# Patient Record
Sex: FEMALE | Race: OTHER | HISPANIC OR LATINO | URBAN - METROPOLITAN AREA
[De-identification: names, ages, dates, MRNs, and addresses within clinical notes are randomized per-mention and may not be internally consistent; named-entity substitution may affect disease eponyms.]

---

## 2022-02-21 PROBLEM — 275978004 COLON CANCER SCREENING: Status: ACTIVE | Noted: 2022-02-21

## 2022-03-07 ENCOUNTER — OFFICE VISIT (OUTPATIENT)
Dept: URBAN - METROPOLITAN AREA SURGERY CENTER 18 | Facility: SURGERY CENTER | Age: 52
End: 2022-03-07
Payer: COMMERCIAL

## 2022-03-07 DIAGNOSIS — Z12.11 AVERAGE RISK FOR CRC. DUE TO PT'S CO-MORBID STATE WITH END STAGE DEMENTIA, HIGH RISK FOR ANESTHESIA (PER NEUROLOGY); INABILITY TO TAKE A BOWEL PREP....WOULD NOT ADVISE ANY COLORECTAL CANCER SCREENING INCLUDING STOOL TEST FOR FECAL BLOOD.: ICD-10-CM

## 2022-03-07 PROCEDURE — 45378 DIAGNOSTIC COLONOSCOPY: CPT | Performed by: INTERNAL MEDICINE

## 2022-03-07 PROCEDURE — G8907 PT DOC NO EVENTS ON DISCHARG: HCPCS | Performed by: INTERNAL MEDICINE

## 2022-03-11 ENCOUNTER — OFFICE VISIT (OUTPATIENT)
Dept: URBAN - METROPOLITAN AREA SURGERY CENTER 18 | Facility: SURGERY CENTER | Age: 52
End: 2022-03-11

## 2022-10-06 ENCOUNTER — TELEPHONE ENCOUNTER (OUTPATIENT)
Dept: URBAN - METROPOLITAN AREA CLINIC 92 | Facility: CLINIC | Age: 52
End: 2022-10-06

## 2022-10-06 ENCOUNTER — OFFICE VISIT (OUTPATIENT)
Dept: URBAN - METROPOLITAN AREA TELEHEALTH 2 | Facility: TELEHEALTH | Age: 52
End: 2022-10-06

## 2022-10-06 RX ORDER — ONDANSETRON 4 MG/1
TABLET, ORALLY DISINTEGRATING ORAL
Qty: 0 | Refills: 0 | Status: ACTIVE | COMMUNITY
Start: 1900-01-01 | End: 1900-01-01

## 2022-10-06 RX ORDER — DICYCLOMINE HYDROCHLORIDE 20 MG/1
TABLET ORAL
Qty: 0 | Refills: 0 | Status: ACTIVE | COMMUNITY
Start: 1900-01-01 | End: 1900-01-01

## 2022-10-06 RX ORDER — MECLIZINE HYDROCHLORIDE 12.5 MG/1
TABLET ORAL
Qty: 0 | Refills: 0 | Status: ACTIVE | COMMUNITY
Start: 2017-08-18 | End: 1900-01-01

## 2022-10-06 RX ORDER — CIPROFLOXACIN HYDROCHLORIDE 500 MG/1
TABLET, FILM COATED ORAL
Qty: 0 | Refills: 0 | Status: ACTIVE | COMMUNITY
Start: 2017-08-15 | End: 1900-01-01

## 2022-10-06 RX ORDER — OMEPRAZOLE 40 MG/1
TAKE 1 CAPSULE BY MOUTH EVERY DAY 30 MINUTES BEFORE A MEAL CAPSULE, DELAYED RELEASE PELLETS ORAL
Qty: 30 | Refills: 11 | Status: ACTIVE | COMMUNITY
Start: 2019-10-29 | End: 1900-01-01

## 2022-10-06 RX ORDER — METOPROLOL SUCCINATE 25 MG/1
TABLET, EXTENDED RELEASE ORAL
Qty: 0 | Refills: 0 | Status: ACTIVE | COMMUNITY
Start: 2017-02-13 | End: 1900-01-01

## 2022-10-06 RX ORDER — ATORVASTATIN CALCIUM 20 MG/1
TABLET, FILM COATED ORAL
Qty: 0 | Refills: 0 | Status: ACTIVE | COMMUNITY
Start: 2017-02-13 | End: 1900-01-01

## 2022-10-06 RX ORDER — CLONAZEPAM 1 MG/1
TABLET ORAL
Qty: 0 | Refills: 0 | Status: ACTIVE | COMMUNITY
Start: 2017-01-31 | End: 1900-01-01

## 2022-10-06 RX ORDER — TRAZODONE HYDROCHLORIDE 50 MG/1
TABLET ORAL
Qty: 0 | Refills: 0 | Status: ACTIVE | COMMUNITY
Start: 1900-01-01 | End: 1900-01-01

## 2022-10-06 RX ORDER — FLUOXETINE 20 MG/1
CAPSULE ORAL
Qty: 0 | Refills: 0 | Status: ACTIVE | COMMUNITY
Start: 1900-01-01 | End: 1900-01-01

## 2022-10-06 RX ORDER — ISOSORBIDE MONONITRATE 30 MG/1
TABLET, EXTENDED RELEASE ORAL
Qty: 0 | Refills: 0 | Status: ACTIVE | COMMUNITY
Start: 2017-02-13 | End: 1900-01-01

## 2022-10-06 RX ORDER — ALBUTEROL SULFATE 90 UG/1
AEROSOL, METERED RESPIRATORY (INHALATION)
Qty: 0 | Refills: 0 | Status: ACTIVE | COMMUNITY
Start: 2017-02-16 | End: 1900-01-01

## 2022-10-06 RX ORDER — METOPROLOL SUCCINATE 50 MG/1
TABLET, EXTENDED RELEASE ORAL
Qty: 0 | Refills: 0 | Status: ACTIVE | COMMUNITY
Start: 2017-08-15 | End: 1900-01-01

## 2022-10-06 RX ORDER — TRAMADOL HYDROCHLORIDE 50 MG/1
TABLET ORAL
Qty: 0 | Refills: 0 | Status: ACTIVE | COMMUNITY
Start: 1900-01-01 | End: 1900-01-01

## 2022-10-06 NOTE — HPI-TODAY'S VISIT:
Patient was not seen today because of the lack of translation services.  She was advised to come in physical into the office

## 2022-10-07 ENCOUNTER — CLAIMS CREATED FROM THE CLAIM WINDOW (OUTPATIENT)
Dept: URBAN - METROPOLITAN AREA CLINIC 82 | Facility: CLINIC | Age: 52
End: 2022-10-07
Payer: COMMERCIAL

## 2022-10-07 ENCOUNTER — LAB OUTSIDE AN ENCOUNTER (OUTPATIENT)
Dept: URBAN - METROPOLITAN AREA CLINIC 82 | Facility: CLINIC | Age: 52
End: 2022-10-07

## 2022-10-07 VITALS
BODY MASS INDEX: 24.8 KG/M2 | HEIGHT: 67 IN | DIASTOLIC BLOOD PRESSURE: 80 MMHG | SYSTOLIC BLOOD PRESSURE: 118 MMHG | HEART RATE: 75 BPM | WEIGHT: 158 LBS | TEMPERATURE: 97 F

## 2022-10-07 DIAGNOSIS — Z86.19 PERSONAL HISTORY OF HELICOBACTER INFECTION: ICD-10-CM

## 2022-10-07 DIAGNOSIS — R10.13 EPIGASTRIC ABDOMINAL PAIN: ICD-10-CM

## 2022-10-07 DIAGNOSIS — K59.00 CONSTIPATION, UNSPECIFIED CONSTIPATION TYPE: ICD-10-CM

## 2022-10-07 DIAGNOSIS — Z87.11 PERSONAL HISTORY OF GASTRIC ULCER: ICD-10-CM

## 2022-10-07 DIAGNOSIS — Z90.3 S/P GASTRIC SLEEVE PROCEDURE: ICD-10-CM

## 2022-10-07 PROBLEM — 14760008: Status: ACTIVE | Noted: 2022-10-07

## 2022-10-07 PROBLEM — 119981000119101: Status: ACTIVE | Noted: 2022-10-07

## 2022-10-07 PROBLEM — 329281000119107: Status: ACTIVE | Noted: 2022-10-07

## 2022-10-07 PROBLEM — 275548000: Status: ACTIVE | Noted: 2022-10-07

## 2022-10-07 PROCEDURE — 99204 OFFICE O/P NEW MOD 45 MIN: CPT | Performed by: INTERNAL MEDICINE

## 2022-10-07 RX ORDER — SUCRALFATE 1 G
1 TABLET ON AN EMPTY STOMACH TABLET ORAL TWICE A DAY
Qty: 60 TABLET | Refills: 0 | OUTPATIENT
Start: 2022-10-07 | End: 2022-11-06

## 2022-10-07 RX ORDER — CIPROFLOXACIN HYDROCHLORIDE 500 MG/1
TABLET, FILM COATED ORAL
Qty: 0 | Refills: 0 | Status: DISCONTINUED | COMMUNITY
Start: 2017-08-15

## 2022-10-07 RX ORDER — CLONAZEPAM 1 MG/1
TABLET ORAL
Qty: 0 | Refills: 0 | Status: DISCONTINUED | COMMUNITY
Start: 2017-01-31

## 2022-10-07 RX ORDER — OMEPRAZOLE 40 MG/1
TAKE 1 CAPSULE BY MOUTH EVERY DAY 30 MINUTES BEFORE A MEAL CAPSULE, DELAYED RELEASE PELLETS ORAL
Qty: 30 | Refills: 11 | Status: ACTIVE | COMMUNITY
Start: 2019-10-29

## 2022-10-07 RX ORDER — ONDANSETRON 4 MG/1
TABLET, ORALLY DISINTEGRATING ORAL
Qty: 0 | Refills: 0 | Status: DISCONTINUED | COMMUNITY
Start: 1900-01-01

## 2022-10-07 RX ORDER — TRAMADOL HYDROCHLORIDE 50 MG/1
TABLET ORAL
Qty: 0 | Refills: 0 | Status: DISCONTINUED | COMMUNITY
Start: 1900-01-01

## 2022-10-07 RX ORDER — METOPROLOL SUCCINATE 50 MG/1
TABLET, EXTENDED RELEASE ORAL
Qty: 0 | Refills: 0 | Status: DISCONTINUED | COMMUNITY
Start: 2017-08-15

## 2022-10-07 RX ORDER — GABAPENTIN 300 MG/1
1 CAPSULE CAPSULE ORAL ONCE A DAY
Status: ACTIVE | COMMUNITY

## 2022-10-07 RX ORDER — METOPROLOL SUCCINATE 25 MG/1
TABLET, EXTENDED RELEASE ORAL
Qty: 0 | Refills: 0 | Status: DISCONTINUED | COMMUNITY
Start: 2017-02-13

## 2022-10-07 RX ORDER — FLUOXETINE 20 MG/1
CAPSULE ORAL
Qty: 0 | Refills: 0 | Status: DISCONTINUED | COMMUNITY
Start: 1900-01-01

## 2022-10-07 RX ORDER — ISOSORBIDE MONONITRATE 30 MG/1
TABLET, EXTENDED RELEASE ORAL
Qty: 0 | Refills: 0 | Status: DISCONTINUED | COMMUNITY
Start: 2017-02-13

## 2022-10-07 RX ORDER — MECLIZINE HYDROCHLORIDE 12.5 MG/1
TABLET ORAL
Qty: 0 | Refills: 0 | Status: DISCONTINUED | COMMUNITY
Start: 2017-08-18

## 2022-10-07 RX ORDER — DICYCLOMINE HYDROCHLORIDE 20 MG/1
TABLET ORAL
Qty: 0 | Refills: 0 | Status: DISCONTINUED | COMMUNITY
Start: 1900-01-01

## 2022-10-07 RX ORDER — LINACLOTIDE 145 UG/1
1 CAPSULE AT LEAST 30 MINUTES BEFORE THE FIRST MEAL OF THE DAY ON AN EMPTY STOMACH CAPSULE, GELATIN COATED ORAL ONCE A DAY
Qty: 90 CAPSULE | Refills: 1 | OUTPATIENT
Start: 2022-10-07 | End: 2023-04-05

## 2022-10-07 RX ORDER — ATORVASTATIN CALCIUM 20 MG/1
TABLET, FILM COATED ORAL
Qty: 0 | Refills: 0 | Status: DISCONTINUED | COMMUNITY
Start: 2017-02-13

## 2022-10-07 RX ORDER — TRAZODONE HYDROCHLORIDE 50 MG/1
TABLET ORAL
Qty: 0 | Refills: 0 | Status: DISCONTINUED | COMMUNITY
Start: 1900-01-01

## 2022-10-07 RX ORDER — FAMOTIDINE 40 MG/1
1 TABLET AT BEDTIME TABLET, FILM COATED ORAL ONCE A DAY
Qty: 90 TABLET | Refills: 1 | OUTPATIENT
Start: 2022-10-07

## 2022-10-07 RX ORDER — ALBUTEROL SULFATE 90 UG/1
AEROSOL, METERED RESPIRATORY (INHALATION)
Qty: 0 | Refills: 0 | Status: DISCONTINUED | COMMUNITY
Start: 2017-02-16

## 2022-10-07 NOTE — HPI-TODAY'S VISIT:
52-year-old  female was seen today for the complaints of having epigastric abdominal pain as well as heartburn.  Patient was seen by in 5 years ago at that time she was diagnosed with a peptic ulcer disease gastritis.  Patient complains of having neck pain and has been taking ibuprofen 800 mg 2-3 times a day for the past month however she has cut down recently to once a week.  Patient reports having significant epigastric pain with eating.  She also had history of hiatal hernia repair with the as well as as gastric sleeve surgery with a weight loss of about 110pounds procedure was done by Dr. Gregory 2 years ago at the [facility].  Patient denies any melanotic stools.  She reports having constipation bowel movements once in every 4 days.  She has some had a colonoscopy earlier this year which was unremarkable except for redundancy of the colon.  Patient denies any vomiting but feels she feels nauseous.  History obtained using a .

## 2022-10-07 NOTE — PHYSICAL EXAM GASTROINTESTINAL
Abdomen , soft, luqtender, nondistended , no guarding or rigidity , no masses palpable , normal bowel sounds , Liver and Spleen , no hepatomegaly present , no hepatosplenomegaly , liver nontender , spleen not palpable

## 2022-10-25 ENCOUNTER — WEB ENCOUNTER (OUTPATIENT)
Dept: URBAN - METROPOLITAN AREA SURGERY CENTER 13 | Facility: SURGERY CENTER | Age: 52
End: 2022-10-25

## 2022-10-27 ENCOUNTER — OFFICE VISIT (OUTPATIENT)
Dept: URBAN - METROPOLITAN AREA SURGERY CENTER 13 | Facility: SURGERY CENTER | Age: 52
End: 2022-10-27
Payer: COMMERCIAL

## 2022-10-27 DIAGNOSIS — R10.13 ABDOMINAL DISCOMFORT, EPIGASTRIC: ICD-10-CM

## 2022-10-27 DIAGNOSIS — K29.60 ADENOPAPILLOMATOSIS GASTRICA: ICD-10-CM

## 2022-10-27 DIAGNOSIS — K20.80 ESOPHAGITIS DISSECANS SUPERFICIALIS: ICD-10-CM

## 2022-10-27 PROCEDURE — G8907 PT DOC NO EVENTS ON DISCHARG: HCPCS | Performed by: INTERNAL MEDICINE

## 2022-10-27 PROCEDURE — 43239 EGD BIOPSY SINGLE/MULTIPLE: CPT | Performed by: INTERNAL MEDICINE

## 2022-10-27 RX ORDER — GABAPENTIN 300 MG/1
1 CAPSULE CAPSULE ORAL ONCE A DAY
Status: ACTIVE | COMMUNITY

## 2022-10-27 RX ORDER — SUCRALFATE 1 G
1 TABLET ON AN EMPTY STOMACH TABLET ORAL TWICE A DAY
Qty: 60 TABLET | Refills: 0 | Status: ACTIVE | COMMUNITY
Start: 2022-10-07 | End: 2022-11-06

## 2022-10-27 RX ORDER — OMEPRAZOLE 40 MG/1
TAKE 1 CAPSULE BY MOUTH EVERY DAY 30 MINUTES BEFORE A MEAL CAPSULE, DELAYED RELEASE PELLETS ORAL
Qty: 30 | Refills: 11 | Status: ACTIVE | COMMUNITY
Start: 2019-10-29

## 2022-10-27 RX ORDER — LINACLOTIDE 145 UG/1
1 CAPSULE AT LEAST 30 MINUTES BEFORE THE FIRST MEAL OF THE DAY ON AN EMPTY STOMACH CAPSULE, GELATIN COATED ORAL ONCE A DAY
Qty: 90 CAPSULE | Refills: 1 | Status: ACTIVE | COMMUNITY
Start: 2022-10-07 | End: 2023-04-05

## 2022-10-27 RX ORDER — FAMOTIDINE 40 MG/1
1 TABLET AT BEDTIME TABLET, FILM COATED ORAL ONCE A DAY
Qty: 90 TABLET | Refills: 1 | Status: ACTIVE | COMMUNITY
Start: 2022-10-07

## 2022-11-04 ENCOUNTER — ERX REFILL RESPONSE (OUTPATIENT)
Dept: URBAN - METROPOLITAN AREA CLINIC 82 | Facility: CLINIC | Age: 52
End: 2022-11-04

## 2022-11-04 RX ORDER — SUCRALFATE 1 G
1 TABLET ON AN EMPTY STOMACH TABLET ORAL TWICE A DAY
Qty: 60 TABLET | Refills: 0 | OUTPATIENT

## 2022-11-04 RX ORDER — SUCRALFATE 1 G/1
TAKE ONE TABLET BY MOUTH TWICE A DAY ON AN EMPTY STOMACH TABLET ORAL
Qty: 60 TABLET | Refills: 0 | OUTPATIENT

## 2023-04-12 ENCOUNTER — ERX REFILL RESPONSE (OUTPATIENT)
Dept: URBAN - METROPOLITAN AREA CLINIC 82 | Facility: CLINIC | Age: 53
End: 2023-04-12

## 2023-04-12 RX ORDER — FAMOTIDINE 40 MG/1
1 TABLET AT BEDTIME TABLET, FILM COATED ORAL ONCE A DAY
Qty: 90 TABLET | Refills: 1 | OUTPATIENT

## 2023-04-12 RX ORDER — FAMOTIDINE 40 MG/1
TAKE ONE TABLET BY MOUTH EVERY NIGHT AT BEDTIME TABLET, FILM COATED ORAL
Qty: 90 TABLET | Refills: 0 | OUTPATIENT

## 2023-11-21 ENCOUNTER — TELEPHONE ENCOUNTER (OUTPATIENT)
Dept: URBAN - METROPOLITAN AREA CLINIC 98 | Facility: CLINIC | Age: 53
End: 2023-11-21

## 2023-11-21 ENCOUNTER — OFFICE VISIT (OUTPATIENT)
Dept: URBAN - METROPOLITAN AREA CLINIC 98 | Facility: CLINIC | Age: 53
End: 2023-11-21

## 2023-11-21 ENCOUNTER — LAB OUTSIDE AN ENCOUNTER (OUTPATIENT)
Dept: URBAN - METROPOLITAN AREA CLINIC 98 | Facility: CLINIC | Age: 53
End: 2023-11-21

## 2023-11-21 ENCOUNTER — OFFICE VISIT (OUTPATIENT)
Dept: URBAN - METROPOLITAN AREA CLINIC 98 | Facility: CLINIC | Age: 53
End: 2023-11-21
Payer: COMMERCIAL

## 2023-11-21 VITALS — HEART RATE: 89 BPM | BODY MASS INDEX: 25.15 KG/M2 | HEIGHT: 67 IN | WEIGHT: 160.2 LBS | TEMPERATURE: 97.3 F

## 2023-11-21 VITALS — HEIGHT: 67 IN

## 2023-11-21 DIAGNOSIS — R10.13 EPIGASTRIC ABDOMINAL PAIN: ICD-10-CM

## 2023-11-21 DIAGNOSIS — Z86.19 PERSONAL HISTORY OF HELICOBACTER INFECTION: ICD-10-CM

## 2023-11-21 DIAGNOSIS — Z90.3 S/P GASTRIC SLEEVE PROCEDURE: ICD-10-CM

## 2023-11-21 PROCEDURE — 99214 OFFICE O/P EST MOD 30 MIN: CPT | Performed by: INTERNAL MEDICINE

## 2023-11-21 RX ORDER — CLONAZEPAM 1 MG/1
TABLET ORAL
Qty: 30 TABLET | Status: ACTIVE | COMMUNITY

## 2023-11-21 RX ORDER — GABAPENTIN 300 MG/1
1 CAPSULE CAPSULE ORAL ONCE A DAY
Status: ACTIVE | COMMUNITY

## 2023-11-21 RX ORDER — OMEPRAZOLE 40 MG/1
TAKE 1 CAPSULE BY MOUTH EVERY DAY 30 MINUTES BEFORE A MEAL CAPSULE, DELAYED RELEASE PELLETS ORAL
Qty: 30 | Refills: 11 | Status: ACTIVE | COMMUNITY
Start: 2019-10-29

## 2023-11-21 RX ORDER — SUCRALFATE 1 G/1
TAKE ONE TABLET BY MOUTH TWICE A DAY ON AN EMPTY STOMACH TABLET ORAL
Qty: 60 TABLET | Refills: 0 | Status: ACTIVE | COMMUNITY

## 2023-11-21 RX ORDER — TRAZODONE HYDROCHLORIDE 50 MG/1
TABLET ORAL
Qty: 90 TABLET | Status: ACTIVE | COMMUNITY

## 2023-11-21 RX ORDER — FAMOTIDINE 40 MG/1
1 TABLET AT BEDTIME TABLET, FILM COATED ORAL ONCE A DAY
Qty: 30 | Refills: 1 | OUTPATIENT
Start: 2023-11-21

## 2023-11-21 RX ORDER — FERROUS SULFATE TAB EC 325 MG (65 MG FE EQUIVALENT) 325 (65 FE) MG
TABLET DELAYED RESPONSE ORAL
Qty: 30 TABLET | Status: ACTIVE | COMMUNITY

## 2023-11-21 RX ORDER — SUCRALFATE 1 G/1
1 TABLET ON AN EMPTY STOMACH TABLET ORAL TWICE A DAY
Qty: 60 | Refills: 1 | OUTPATIENT
Start: 2023-11-21 | End: 2024-01-20

## 2023-11-21 RX ORDER — FAMOTIDINE 40 MG/1
TAKE ONE TABLET BY MOUTH EVERY NIGHT AT BEDTIME TABLET, FILM COATED ORAL
Qty: 90 TABLET | Refills: 0 | Status: ACTIVE | COMMUNITY

## 2023-11-21 RX ORDER — PANTOPRAZOLE SODIUM 40 MG/1
1 TABLET TABLET, DELAYED RELEASE ORAL TWICE DAILY
Qty: 60 | Refills: 1 | OUTPATIENT
Start: 2023-11-21

## 2023-11-21 RX ORDER — BUPROPION HYDROCHLORIDE 150 MG/1
TABLET, FILM COATED, EXTENDED RELEASE ORAL
Qty: 90 TABLET | Status: ACTIVE | COMMUNITY

## 2023-11-21 NOTE — HPI-TODAY'S VISIT:
52-year-old  female was seen today for the complaints of having epigastric abdominal pain as well as heartburn.  Patient was seen by in 5 years ago at that time she was diagnosed with a peptic ulcer disease gastritis.  Patient complains of having neck pain and has been taking ibuprofen 800 mg 2-3 times a day for the past month however she has cut down recently to once a week.  Patient reports having significant epigastric pain with eating.  She also had history of hiatal hernia repair with the as well as as gastric sleeve surgery with a weight loss of about 110pounds procedure was done by Dr. Gregory 2 years ago at the [facility].  Patient denies any melanotic stools.  She reports having constipation bowel movements once in every 4 days.  She has some had a colonoscopy earlier this year which was unremarkable except for redundancy of the colon.  Patient denies any vomiting but feels she feels nauseous.  History obtained using a .  11/21/23  on line colonoscopy 3/2022 EGD  10/2022 sleeve gastrectomy erosions burning in stomach nausea bad taste in mouth medicine not helping

## 2023-11-22 ENCOUNTER — TELEPHONE ENCOUNTER (OUTPATIENT)
Dept: URBAN - METROPOLITAN AREA CLINIC 98 | Facility: CLINIC | Age: 53
End: 2023-11-22

## 2023-11-22 LAB
A/G RATIO: 1.8
ALBUMIN: 4.2
ALKALINE PHOSPHATASE: 55
ALT (SGPT): 8
AST (SGOT): 12
BILIRUBIN, TOTAL: 0.5
BUN/CREATININE RATIO: (no result)
BUN: 14
CALCIUM: 9.2
CARBON DIOXIDE, TOTAL: 28
CHLORIDE: 103
CREATININE: 0.59
EGFR: 108
GLOBULIN, TOTAL: 2.4
GLUCOSE: 76
HEMATOCRIT: 36.2
HEMOGLOBIN: 11.7
LIPASE: 34
MCH: 27.9
MCHC: 32.3
MCV: 86.4
MPV: 12.3
PLATELET COUNT: 181
POTASSIUM: 4.5
PROTEIN, TOTAL: 6.6
RDW: 13.5
RED BLOOD CELL COUNT: 4.19
SODIUM: 140
WHITE BLOOD CELL COUNT: 2.5

## 2024-01-04 ENCOUNTER — OFFICE VISIT (OUTPATIENT)
Dept: URBAN - METROPOLITAN AREA CLINIC 98 | Facility: CLINIC | Age: 54
End: 2024-01-04
Payer: COMMERCIAL

## 2024-01-04 VITALS — HEIGHT: 67 IN | HEART RATE: 98 BPM | BODY MASS INDEX: 24.67 KG/M2 | TEMPERATURE: 98.1 F | WEIGHT: 157.2 LBS

## 2024-01-04 DIAGNOSIS — R10.13 EPIGASTRIC ABDOMINAL PAIN: ICD-10-CM

## 2024-01-04 PROCEDURE — 99214 OFFICE O/P EST MOD 30 MIN: CPT | Performed by: INTERNAL MEDICINE

## 2024-01-04 RX ORDER — SUCRALFATE 1 G/1
TAKE ONE TABLET BY MOUTH TWICE A DAY ON AN EMPTY STOMACH TABLET ORAL
Qty: 60 TABLET | Refills: 0 | Status: ACTIVE | COMMUNITY

## 2024-01-04 RX ORDER — GABAPENTIN 300 MG/1
1 CAPSULE CAPSULE ORAL ONCE A DAY
Status: ACTIVE | COMMUNITY

## 2024-01-04 RX ORDER — OMEPRAZOLE 40 MG/1
TAKE 1 CAPSULE BY MOUTH EVERY DAY 30 MINUTES BEFORE A MEAL CAPSULE, DELAYED RELEASE PELLETS ORAL
Qty: 30 | Refills: 11 | Status: ACTIVE | COMMUNITY
Start: 2019-10-29

## 2024-01-04 RX ORDER — PANTOPRAZOLE SODIUM 40 MG/1
1 TABLET TABLET, DELAYED RELEASE ORAL TWICE DAILY
Qty: 180 | Refills: 1
Start: 2023-11-21

## 2024-01-04 RX ORDER — FAMOTIDINE 40 MG/1
TAKE ONE TABLET BY MOUTH EVERY NIGHT AT BEDTIME TABLET, FILM COATED ORAL
Qty: 90 TABLET | Refills: 0 | Status: ACTIVE | COMMUNITY

## 2024-01-04 RX ORDER — SUCRALFATE 1 G/1
TAKE ONE TABLET BY MOUTH TWICE A DAY ON AN EMPTY STOMACH TABLET ORAL TWICE A DAY
Qty: 180 | Refills: 1

## 2024-01-04 RX ORDER — CLONAZEPAM 1 MG/1
TABLET ORAL
Qty: 30 TABLET | Status: ACTIVE | COMMUNITY

## 2024-01-04 RX ORDER — TRAZODONE HYDROCHLORIDE 50 MG/1
TABLET ORAL
Qty: 90 TABLET | Status: ACTIVE | COMMUNITY

## 2024-01-04 RX ORDER — BUPROPION HYDROCHLORIDE 150 MG/1
TABLET, FILM COATED, EXTENDED RELEASE ORAL
Qty: 90 TABLET | Status: ACTIVE | COMMUNITY

## 2024-01-04 RX ORDER — FERROUS SULFATE TAB EC 325 MG (65 MG FE EQUIVALENT) 325 (65 FE) MG
TABLET DELAYED RESPONSE ORAL
Qty: 30 TABLET | Status: ACTIVE | COMMUNITY

## 2024-01-04 NOTE — HPI-TODAY'S VISIT:
52-year-old  female was seen today for the complaints of having epigastric abdominal pain as well as heartburn.  Patient was seen by in 5 years ago at that time she was diagnosed with a peptic ulcer disease gastritis.  Patient complains of having neck pain and has been taking ibuprofen 800 mg 2-3 times a day for the past month however she has cut down recently to once a week.  Patient reports having significant epigastric pain with eating.  She also had history of hiatal hernia repair with the as well as as gastric sleeve surgery with a weight loss of about 110pounds procedure was done by Dr. Gregory 2 years ago at the [facility].  Patient denies any melanotic stools.  She reports having constipation bowel movements once in every 4 days.  She has some had a colonoscopy earlier this year which was unremarkable except for redundancy of the colon.  Patient denies any vomiting but feels she feels nauseous.  History obtained using a .  11/21/23  on line colonoscopy 3/2022 EGD  10/2022 sleeve gastrectomy erosions burning in stomach nausea bad taste in mouth medicine not helping . 12/4/24 interpeter on line epigastric pain every day went to ER 2 days ago burning pain, nausea had CT scan at [facility].

## 2024-01-08 LAB — H PYLORI BREATH TEST: NOT DETECTED

## 2024-01-17 ENCOUNTER — OUT OF OFFICE VISIT (OUTPATIENT)
Dept: URBAN - METROPOLITAN AREA SURGERY CENTER 18 | Facility: SURGERY CENTER | Age: 54
End: 2024-01-17
Payer: COMMERCIAL

## 2024-01-17 ENCOUNTER — CLAIMS CREATED FROM THE CLAIM WINDOW (OUTPATIENT)
Dept: URBAN - METROPOLITAN AREA CLINIC 4 | Facility: CLINIC | Age: 54
End: 2024-01-17
Payer: COMMERCIAL

## 2024-01-17 DIAGNOSIS — R12 HEARTBURN: ICD-10-CM

## 2024-01-17 DIAGNOSIS — K29.40 CHRONIC ATROPHIC GASTRITIS WITHOUT BLEEDING: ICD-10-CM

## 2024-01-17 DIAGNOSIS — K21.00 GASTRO-ESOPHAGEAL REFLUX DISEASE WITH ESOPHAGITIS, WITHOUT BLEEDING: ICD-10-CM

## 2024-01-17 DIAGNOSIS — K20.90 ESOPHAGITIS: ICD-10-CM

## 2024-01-17 DIAGNOSIS — K31.A11 GASTRIC INTESTINAL METAPLASIA WITHOUT DYSPLASIA, INVOLVING THE ANTRUM: ICD-10-CM

## 2024-01-17 DIAGNOSIS — K29.60 OTHER GASTRITIS WITHOUT BLEEDING: ICD-10-CM

## 2024-01-17 DIAGNOSIS — Z98.0 INTESTINAL ANASTOMOSIS PRESENT: ICD-10-CM

## 2024-01-17 DIAGNOSIS — K20.80 ESOPHAGITIS, LOS ANGELES GRADE A: ICD-10-CM

## 2024-01-17 PROCEDURE — G8907 PT DOC NO EVENTS ON DISCHARG: HCPCS | Performed by: INTERNAL MEDICINE

## 2024-01-17 PROCEDURE — 88342 IMHCHEM/IMCYTCHM 1ST ANTB: CPT | Performed by: PATHOLOGY

## 2024-01-17 PROCEDURE — 88305 TISSUE EXAM BY PATHOLOGIST: CPT | Performed by: PATHOLOGY

## 2024-01-17 PROCEDURE — 88341 IMHCHEM/IMCYTCHM EA ADD ANTB: CPT | Performed by: PATHOLOGY

## 2024-01-17 PROCEDURE — 00731 ANES UPR GI NDSC PX NOS: CPT | Performed by: NURSE ANESTHETIST, CERTIFIED REGISTERED

## 2024-01-17 PROCEDURE — 43239 EGD BIOPSY SINGLE/MULTIPLE: CPT | Performed by: INTERNAL MEDICINE

## 2024-01-17 RX ORDER — CLONAZEPAM 1 MG/1
TABLET ORAL
Qty: 30 TABLET | Status: ACTIVE | COMMUNITY

## 2024-01-17 RX ORDER — PANTOPRAZOLE SODIUM 40 MG/1
1 TABLET TABLET, DELAYED RELEASE ORAL TWICE DAILY
Qty: 180 | Refills: 1 | Status: ACTIVE | COMMUNITY
Start: 2023-11-21

## 2024-01-17 RX ORDER — TRAZODONE HYDROCHLORIDE 50 MG/1
TABLET ORAL
Qty: 90 TABLET | Status: ACTIVE | COMMUNITY

## 2024-01-17 RX ORDER — BUPROPION HYDROCHLORIDE 150 MG/1
TABLET, FILM COATED, EXTENDED RELEASE ORAL
Qty: 90 TABLET | Status: ACTIVE | COMMUNITY

## 2024-01-17 RX ORDER — OMEPRAZOLE 40 MG/1
TAKE 1 CAPSULE BY MOUTH EVERY DAY 30 MINUTES BEFORE A MEAL CAPSULE, DELAYED RELEASE PELLETS ORAL
Qty: 30 | Refills: 11 | Status: ACTIVE | COMMUNITY
Start: 2019-10-29

## 2024-01-17 RX ORDER — FAMOTIDINE 40 MG/1
TAKE ONE TABLET BY MOUTH EVERY NIGHT AT BEDTIME TABLET, FILM COATED ORAL
Qty: 90 TABLET | Refills: 0 | Status: ACTIVE | COMMUNITY

## 2024-01-17 RX ORDER — GABAPENTIN 300 MG/1
1 CAPSULE CAPSULE ORAL ONCE A DAY
Status: ACTIVE | COMMUNITY

## 2024-01-17 RX ORDER — FERROUS SULFATE TAB EC 325 MG (65 MG FE EQUIVALENT) 325 (65 FE) MG
TABLET DELAYED RESPONSE ORAL
Qty: 30 TABLET | Status: ACTIVE | COMMUNITY

## 2024-01-17 RX ORDER — SUCRALFATE 1 G/1
TAKE ONE TABLET BY MOUTH TWICE A DAY ON AN EMPTY STOMACH TABLET ORAL TWICE A DAY
Qty: 180 | Refills: 1 | Status: ACTIVE | COMMUNITY

## 2024-01-19 ENCOUNTER — TELEPHONE ENCOUNTER (OUTPATIENT)
Dept: URBAN - METROPOLITAN AREA CLINIC 98 | Facility: CLINIC | Age: 54
End: 2024-01-19

## 2024-02-20 ENCOUNTER — OV EP (OUTPATIENT)
Dept: URBAN - METROPOLITAN AREA CLINIC 98 | Facility: CLINIC | Age: 54
End: 2024-02-20

## 2024-02-20 RX ORDER — OMEPRAZOLE 40 MG/1
TAKE 1 CAPSULE BY MOUTH EVERY DAY 30 MINUTES BEFORE A MEAL CAPSULE, DELAYED RELEASE PELLETS ORAL
Qty: 30 | Refills: 11 | Status: ACTIVE | COMMUNITY
Start: 2019-10-29

## 2024-02-20 RX ORDER — FAMOTIDINE 40 MG/1
TAKE ONE TABLET BY MOUTH EVERY NIGHT AT BEDTIME TABLET, FILM COATED ORAL
Qty: 90 TABLET | Refills: 0 | Status: ACTIVE | COMMUNITY

## 2024-02-20 RX ORDER — PANTOPRAZOLE SODIUM 40 MG/1
1 TABLET TABLET, DELAYED RELEASE ORAL TWICE DAILY
Qty: 180 | Refills: 1 | Status: ACTIVE | COMMUNITY
Start: 2023-11-21

## 2024-02-20 RX ORDER — TRAZODONE HYDROCHLORIDE 50 MG/1
TABLET ORAL
Qty: 90 TABLET | Status: ACTIVE | COMMUNITY

## 2024-02-20 RX ORDER — CLONAZEPAM 1 MG/1
TABLET ORAL
Qty: 30 TABLET | Status: ACTIVE | COMMUNITY

## 2024-02-20 RX ORDER — FERROUS SULFATE TAB EC 325 MG (65 MG FE EQUIVALENT) 325 (65 FE) MG
TABLET DELAYED RESPONSE ORAL
Qty: 30 TABLET | Status: ACTIVE | COMMUNITY

## 2024-02-20 RX ORDER — BUPROPION HYDROCHLORIDE 150 MG/1
TABLET, FILM COATED, EXTENDED RELEASE ORAL
Qty: 90 TABLET | Status: ACTIVE | COMMUNITY

## 2024-02-20 RX ORDER — SUCRALFATE 1 G/1
TAKE ONE TABLET BY MOUTH TWICE A DAY ON AN EMPTY STOMACH TABLET ORAL TWICE A DAY
Qty: 180 | Refills: 1 | Status: ACTIVE | COMMUNITY

## 2024-02-20 RX ORDER — GABAPENTIN 300 MG/1
1 CAPSULE CAPSULE ORAL ONCE A DAY
Status: ACTIVE | COMMUNITY

## 2024-02-27 ENCOUNTER — OV EP (OUTPATIENT)
Dept: URBAN - METROPOLITAN AREA CLINIC 98 | Facility: CLINIC | Age: 54
End: 2024-02-27
Payer: COMMERCIAL

## 2024-02-27 ENCOUNTER — LAB (OUTPATIENT)
Dept: URBAN - METROPOLITAN AREA CLINIC 98 | Facility: CLINIC | Age: 54
End: 2024-02-27

## 2024-02-27 VITALS
SYSTOLIC BLOOD PRESSURE: 91 MMHG | HEART RATE: 75 BPM | DIASTOLIC BLOOD PRESSURE: 64 MMHG | HEIGHT: 67 IN | WEIGHT: 159 LBS | TEMPERATURE: 97.1 F | BODY MASS INDEX: 24.96 KG/M2

## 2024-02-27 DIAGNOSIS — R10.33 PERIUMBILICAL PAIN: ICD-10-CM

## 2024-02-27 PROCEDURE — 99214 OFFICE O/P EST MOD 30 MIN: CPT | Performed by: INTERNAL MEDICINE

## 2024-02-27 RX ORDER — FAMOTIDINE 40 MG/1
TAKE ONE TABLET BY MOUTH EVERY NIGHT AT BEDTIME TABLET, FILM COATED ORAL
Qty: 90 TABLET | Refills: 0 | Status: ACTIVE | COMMUNITY

## 2024-02-27 RX ORDER — FERROUS SULFATE TAB EC 325 MG (65 MG FE EQUIVALENT) 325 (65 FE) MG
TABLET DELAYED RESPONSE ORAL
Qty: 30 TABLET | Status: ACTIVE | COMMUNITY

## 2024-02-27 RX ORDER — OMEPRAZOLE 40 MG/1
TAKE 1 CAPSULE BY MOUTH EVERY DAY 30 MINUTES BEFORE A MEAL CAPSULE, DELAYED RELEASE PELLETS ORAL
Qty: 30 | Refills: 11 | Status: ACTIVE | COMMUNITY
Start: 2019-10-29

## 2024-02-27 RX ORDER — SUCRALFATE 1 G/1
TAKE ONE TABLET BY MOUTH TWICE A DAY ON AN EMPTY STOMACH TABLET ORAL TWICE A DAY
Qty: 180 | Refills: 1 | Status: ACTIVE | COMMUNITY

## 2024-02-27 RX ORDER — CLONAZEPAM 1 MG/1
TABLET ORAL
Qty: 30 TABLET | Status: ACTIVE | COMMUNITY

## 2024-02-27 RX ORDER — TRAZODONE HYDROCHLORIDE 50 MG/1
TABLET ORAL
Qty: 90 TABLET | Status: ACTIVE | COMMUNITY

## 2024-02-27 RX ORDER — PANTOPRAZOLE SODIUM 40 MG/1
1 TABLET TABLET, DELAYED RELEASE ORAL TWICE DAILY
Qty: 180 | Refills: 1 | Status: ACTIVE | COMMUNITY
Start: 2023-11-21

## 2024-02-27 RX ORDER — BUPROPION HYDROCHLORIDE 150 MG/1
TABLET, FILM COATED, EXTENDED RELEASE ORAL
Qty: 90 TABLET | Status: ACTIVE | COMMUNITY

## 2024-02-27 RX ORDER — GABAPENTIN 300 MG/1
1 CAPSULE CAPSULE ORAL ONCE A DAY
Status: ACTIVE | COMMUNITY

## 2024-02-27 NOTE — HPI-TODAY'S VISIT:
EGD  1/17/2024 52-year-old  female was seen today for the complaints of having epigastric abdominal pain as well as heartburn.  Patient was seen by in 5 years ago at that time she was diagnosed with a peptic ulcer disease gastritis.  Patient complains of having neck pain and has been taking ibuprofen 800 mg 2-3 times a day for the past month however she has cut down recently to once a week.  Patient reports having significant epigastric pain with eating.  She also had history of hiatal hernia repair with the as well as as gastric sleeve surgery with a weight loss of about 110pounds procedure was done by Dr. Gregory 2 years ago at the [facility].  Patient denies any melanotic stools.  She reports having constipation bowel movements once in every 4 days.  She has some had a colonoscopy earlier this year which was unremarkable except for redundancy of the colon.  Patient denies any vomiting but feels she feels nauseous.  History obtained using a .  11/21/23  on line colonoscopy 3/2022 EGD  10/2022 sleeve gastrectomy erosions burning in stomach nausea bad taste in mouth medicine not helping . 12/4/24 interpeter on line epigastric pain every day went to ER 2 days ago burning pain, nausea had CT scan at [facility].  2/27/24  on line

## 2024-07-19 ENCOUNTER — TELEPHONE ENCOUNTER (OUTPATIENT)
Dept: URBAN - METROPOLITAN AREA CLINIC 98 | Facility: CLINIC | Age: 54
End: 2024-07-19

## 2024-07-19 RX ORDER — PANTOPRAZOLE SODIUM 40 MG/1
1 TABLET TABLET, DELAYED RELEASE ORAL TWICE DAILY
Qty: 180 | Refills: 1
Start: 2023-11-21